# Patient Record
Sex: FEMALE | Race: WHITE | Employment: FULL TIME | URBAN - METROPOLITAN AREA
[De-identification: names, ages, dates, MRNs, and addresses within clinical notes are randomized per-mention and may not be internally consistent; named-entity substitution may affect disease eponyms.]

---

## 2023-12-23 ENCOUNTER — APPOINTMENT (OUTPATIENT)
Dept: GENERAL RADIOLOGY | Age: 38
End: 2023-12-23
Payer: OTHER GOVERNMENT

## 2023-12-23 ENCOUNTER — HOSPITAL ENCOUNTER (EMERGENCY)
Age: 38
Discharge: HOME | End: 2023-12-23
Attending: EMERGENCY MEDICINE
Payer: OTHER GOVERNMENT

## 2023-12-23 VITALS
TEMPERATURE: 98 F | WEIGHT: 150 LBS | SYSTOLIC BLOOD PRESSURE: 121 MMHG | DIASTOLIC BLOOD PRESSURE: 65 MMHG | BODY MASS INDEX: 24.99 KG/M2 | RESPIRATION RATE: 20 BRPM | OXYGEN SATURATION: 99 % | HEIGHT: 65 IN | HEART RATE: 80 BPM

## 2023-12-23 DIAGNOSIS — J10.1 INFLUENZA B: Primary | ICD-10-CM

## 2023-12-23 LAB
FLUAV RNA SPEC QL NAA+PROBE: NOT DETECTED
FLUBV RNA SPEC QL NAA+PROBE: DETECTED
SARS-COV-2 RDRP RESP QL NAA+PROBE: NOT DETECTED
SOURCE: NORMAL
STREP, MOLECULAR: NOT DETECTED

## 2023-12-23 PROCEDURE — 99284 EMERGENCY DEPT VISIT MOD MDM: CPT

## 2023-12-23 PROCEDURE — 87651 STREP A DNA AMP PROBE: CPT

## 2023-12-23 PROCEDURE — 87502 INFLUENZA DNA AMP PROBE: CPT

## 2023-12-23 PROCEDURE — 71046 X-RAY EXAM CHEST 2 VIEWS: CPT

## 2023-12-23 PROCEDURE — 87635 SARS-COV-2 COVID-19 AMP PRB: CPT

## 2023-12-23 RX ORDER — GUAIFENESIN AND DEXTROMETHORPHAN HYDROBROMIDE 1200; 60 MG/1; MG/1
1 TABLET, EXTENDED RELEASE ORAL 2 TIMES DAILY
Qty: 28 TABLET | Refills: 0 | Status: SHIPPED | OUTPATIENT
Start: 2023-12-23

## 2023-12-23 RX ORDER — MODAFINIL 200 MG/1
200 TABLET ORAL DAILY
COMMUNITY

## 2023-12-23 RX ORDER — ALBUTEROL SULFATE 90 UG/1
2 AEROSOL, METERED RESPIRATORY (INHALATION) EVERY 6 HOURS PRN
COMMUNITY

## 2023-12-23 RX ORDER — HYDROCODONE BITARTRATE AND HOMATROPINE METHYLBROMIDE ORAL SOLUTION 5; 1.5 MG/5ML; MG/5ML
5 LIQUID ORAL EVERY 6 HOURS PRN
Qty: 100 ML | Refills: 0 | Status: SHIPPED | OUTPATIENT
Start: 2023-12-23 | End: 2023-12-28

## 2023-12-23 RX ORDER — METHYLPREDNISOLONE 4 MG/1
TABLET ORAL
Qty: 1 KIT | Refills: 0 | Status: SHIPPED | OUTPATIENT
Start: 2023-12-23

## 2023-12-23 NOTE — DISCHARGE INSTRUCTIONS
Take medications as directed    Use tylenol and motrin for fever control. Alternate doses of each at three hour intervals for temperature over 100.4 F      Call your primary care doctor for follow-up visit  We would love to help you get a primary care doctor for follow-up after your emergency department visit. Please call [phone] between 7AM - 6PM Monday to Friday. A care navigator will be able to assist you with setting up a doctor close to your home.       Drink plenty fluids    Do not drink alcohol or drive while taking the prescription pain medications    Return to ER for any worsening symptoms or new problems which may arise

## 2023-12-23 NOTE — ED PROVIDER NOTES
Emergency Department Provider Note       PCP: Unknown, Provider, DO   Age: 41 y.o. Sex: female     DISPOSITION Decision To Discharge 12/23/2023 08:36:19 AM       ICD-10-CM    1. Influenza B  J10.1 HYDROcodone homatropine (HYCODAN) 5-1.5 MG/5ML solution          Medical Decision Making     Complexity of Problems Addressed:  Complexity of Problem: 1 acute, uncomplicated illness or injury. Data Reviewed and Analyzed:  I independently ordered and reviewed each unique test.     The patients assessment required an independent historian: family at bedside. The reason they were needed is important historical information not provided by the patient. I interpreted the X-rays. No acute cardiopulmonary disease identified on plain images of the chest today    Discussion of management or test interpretation. 70-year-old female patient with a history of asthma presents to the ER with complaints of fever body aches sore throat and dysphagia  Workup today reveals flu be positive  COVID-negative and chest x-ray is clear  Will treat symptomatically with steroids some cough syrup Mucinex  Referred for outpatient follow-up       Risk of Complications and/or Morbidity of Patient Management:  Prescription drug management performed and Shared medical decision making was utilized in creating the patients health plan today. History      70-year-old female patient presents with 2 to 3-day history of runny nose congestion cough and is developed a sore throat  Does report pain with swallowing more on the right side of her throat  Subjective fevers  Positive ill contacts at home  No vomiting or diarrhea  History of asthma    The history is provided by the patient and the spouse.    Pharyngitis  Location:  Generalized  Quality:  Aching and burning  Severity:  Moderate  Onset quality:  Gradual  Timing:  Constant  Progression:  Worsening  Chronicity:  New  Relieved by:  Nothing  Worsened by:  Eating, swallowing and

## 2023-12-23 NOTE — ED TRIAGE NOTES
Pt c/o having cough, congestion, runny nose and sore throat the past few days but woke up today with difficulty swallowing.